# Patient Record
Sex: MALE | Employment: UNEMPLOYED | URBAN - METROPOLITAN AREA
[De-identification: names, ages, dates, MRNs, and addresses within clinical notes are randomized per-mention and may not be internally consistent; named-entity substitution may affect disease eponyms.]

---

## 2017-09-15 ENCOUNTER — HOSPITAL ENCOUNTER (EMERGENCY)
Facility: CLINIC | Age: 27
Discharge: HOME | End: 2017-09-15
Attending: EMERGENCY MEDICINE | Admitting: EMERGENCY MEDICINE
Payer: COMMERCIAL

## 2017-09-15 VITALS
DIASTOLIC BLOOD PRESSURE: 89 MMHG | OXYGEN SATURATION: 98 % | TEMPERATURE: 97.8 F | WEIGHT: 185.8 LBS | HEART RATE: 74 BPM | RESPIRATION RATE: 18 BRPM | SYSTOLIC BLOOD PRESSURE: 132 MMHG | HEIGHT: 72 IN | BODY MASS INDEX: 25.17 KG/M2

## 2017-09-15 DIAGNOSIS — R11.2 NAUSEA AND VOMITING, INTRACTABILITY OF VOMITING NOT SPECIFIED, UNSPECIFIED VOMITING TYPE: ICD-10-CM

## 2017-09-15 DIAGNOSIS — Z79.899 CHRONIC PRESCRIPTION BENZODIAZEPINE USE: ICD-10-CM

## 2017-09-15 DIAGNOSIS — F10.939 ALCOHOL WITHDRAWAL, WITH UNSPECIFIED COMPLICATION (H): ICD-10-CM

## 2017-09-15 LAB
ALBUMIN SERPL-MCNC: 4.2 G/DL (ref 3.4–5)
ALP SERPL-CCNC: 73 U/L (ref 40–150)
ALT SERPL W P-5'-P-CCNC: 25 U/L (ref 0–70)
ANION GAP SERPL CALCULATED.3IONS-SCNC: 8 MMOL/L (ref 3–14)
AST SERPL W P-5'-P-CCNC: 26 U/L (ref 0–45)
BASOPHILS # BLD AUTO: 0 10E9/L (ref 0–0.2)
BASOPHILS NFR BLD AUTO: 0.4 %
BILIRUB SERPL-MCNC: 1.3 MG/DL (ref 0.2–1.3)
BUN SERPL-MCNC: 8 MG/DL (ref 7–30)
CALCIUM SERPL-MCNC: 9.1 MG/DL (ref 8.5–10.1)
CHLORIDE SERPL-SCNC: 105 MMOL/L (ref 94–109)
CO2 SERPL-SCNC: 25 MMOL/L (ref 20–32)
CREAT SERPL-MCNC: 0.75 MG/DL (ref 0.66–1.25)
DIFFERENTIAL METHOD BLD: NORMAL
EOSINOPHIL # BLD AUTO: 0.3 10E9/L (ref 0–0.7)
EOSINOPHIL NFR BLD AUTO: 3.9 %
ERYTHROCYTE [DISTWIDTH] IN BLOOD BY AUTOMATED COUNT: 13.2 % (ref 10–15)
ETHANOL SERPL-MCNC: <0.01 G/DL
GFR SERPL CREATININE-BSD FRML MDRD: >90 ML/MIN/1.7M2
GLUCOSE SERPL-MCNC: 103 MG/DL (ref 70–99)
HCT VFR BLD AUTO: 47.3 % (ref 40–53)
HGB BLD-MCNC: 17 G/DL (ref 13.3–17.7)
IMM GRANULOCYTES # BLD: 0 10E9/L (ref 0–0.4)
IMM GRANULOCYTES NFR BLD: 0.1 %
LIPASE SERPL-CCNC: 154 U/L (ref 73–393)
LYMPHOCYTES # BLD AUTO: 1.9 10E9/L (ref 0.8–5.3)
LYMPHOCYTES NFR BLD AUTO: 28.3 %
MCH RBC QN AUTO: 29.4 PG (ref 26.5–33)
MCHC RBC AUTO-ENTMCNC: 35.9 G/DL (ref 31.5–36.5)
MCV RBC AUTO: 82 FL (ref 78–100)
MONOCYTES # BLD AUTO: 0.7 10E9/L (ref 0–1.3)
MONOCYTES NFR BLD AUTO: 9.8 %
NEUTROPHILS # BLD AUTO: 3.9 10E9/L (ref 1.6–8.3)
NEUTROPHILS NFR BLD AUTO: 57.5 %
NRBC # BLD AUTO: 0 10*3/UL
NRBC BLD AUTO-RTO: 0 /100
PLATELET # BLD AUTO: 262 10E9/L (ref 150–450)
POTASSIUM SERPL-SCNC: 3.8 MMOL/L (ref 3.4–5.3)
PROT SERPL-MCNC: 7.9 G/DL (ref 6.8–8.8)
RBC # BLD AUTO: 5.79 10E12/L (ref 4.4–5.9)
SODIUM SERPL-SCNC: 138 MMOL/L (ref 133–144)
WBC # BLD AUTO: 6.8 10E9/L (ref 4–11)

## 2017-09-15 PROCEDURE — 25000128 H RX IP 250 OP 636: Performed by: EMERGENCY MEDICINE

## 2017-09-15 PROCEDURE — 85025 COMPLETE CBC W/AUTO DIFF WBC: CPT | Performed by: EMERGENCY MEDICINE

## 2017-09-15 PROCEDURE — 25000132 ZZH RX MED GY IP 250 OP 250 PS 637: Performed by: EMERGENCY MEDICINE

## 2017-09-15 PROCEDURE — 99284 EMERGENCY DEPT VISIT MOD MDM: CPT | Mod: 25

## 2017-09-15 PROCEDURE — 96374 THER/PROPH/DIAG INJ IV PUSH: CPT

## 2017-09-15 PROCEDURE — 80053 COMPREHEN METABOLIC PANEL: CPT | Performed by: EMERGENCY MEDICINE

## 2017-09-15 PROCEDURE — 80320 DRUG SCREEN QUANTALCOHOLS: CPT | Performed by: EMERGENCY MEDICINE

## 2017-09-15 PROCEDURE — 83690 ASSAY OF LIPASE: CPT | Performed by: EMERGENCY MEDICINE

## 2017-09-15 RX ORDER — ONDANSETRON 2 MG/ML
4 INJECTION INTRAMUSCULAR; INTRAVENOUS ONCE
Status: COMPLETED | OUTPATIENT
Start: 2017-09-15 | End: 2017-09-15

## 2017-09-15 RX ORDER — CLONAZEPAM 0.5 MG/1
1 TABLET ORAL ONCE
Status: COMPLETED | OUTPATIENT
Start: 2017-09-15 | End: 2017-09-15

## 2017-09-15 RX ADMIN — ONDANSETRON 4 MG: 2 SOLUTION INTRAMUSCULAR; INTRAVENOUS at 01:31

## 2017-09-15 RX ADMIN — CLONAZEPAM 1 MG: 0.5 TABLET ORAL at 02:19

## 2017-09-15 ASSESSMENT — ENCOUNTER SYMPTOMS
DIAPHORESIS: 1
VOMITING: 1

## 2017-09-15 NOTE — ED AVS SNAPSHOT
Emergency Department    [address] [facility] [address]    Phone:  [phone]    Fax:  [phone]                                       Baltazar Pena   MRN: [id number]    Department:   Emergency Department   Date of Visit:  9/15/2017           Patient Information     Date Of Birth          1990        Your diagnoses for this visit were:     Alcohol withdrawal, with unspecified complication (H)     Chronic prescription benzodiazepine use     Nausea and vomiting, intractability of vomiting not specified, unspecified vomiting type        You were seen by Camilo Ramsey MD.      Follow-up Information     Follow up with [facility]. Call in 1 day.    Specialties:  Podiatry, Internal Medicine, Family Medicine    Why:  to make appt to set up a primary care clinic and for recheck early next week    Contact information:    [address] [id number] [phone]        Follow up with  Emergency Department.    Specialty:  EMERGENCY MEDICINE    Why:  As needed, If symptoms worsen    Contact information:    [address] [facility] [address] [phone]      Discharge References/Attachments     ALCOHOL WITHDRAWAL: WHAT TO EXPECT (ENGLISH)      24 Hour Appointment Hotline       To make an appointment at any [facility], call [phone] ([phone]). If you don't have a family doctor or clinic, we will help you find one. [facility] are conveniently located to serve the needs of you and your family.             Review of your medicines      Our records show that you are taking the medicines listed below. If these are incorrect, please call your family doctor or clinic.        Dose / Directions Last dose taken    ADDERALL PO   Dose:  20 mg        Take 20 mg by mouth 2 times daily   Refills:  0        CLONAZEPAM PO   Dose:  1 mg        Take 1 mg by mouth 3 times daily as needed for anxiety   Refills:  0         MELATONIN PO   Dose:  30 mg        Take 30 mg by mouth nightly as needed Patient states he takes 10 x 3mg tablets for a total of 30mg at bedtime if needed.   Refills:  0        NONFORMULARY   Dose:  4 capsule        Take 4 capsules by mouth daily testosterone booster(P6)   Refills:  0                Procedures and tests performed during your visit     Alcohol ethyl    CBC with platelets differential    Comprehensive metabolic panel    Lipase      Orders Needing Specimen Collection     None      Pending Results     No orders found from 9/13/2017 to 9/16/2017.            Pending Culture Results     No orders found from 9/13/2017 to 9/16/2017.            Pending Results Instructions     If you had any lab results that were not finalized at the time of your Discharge, you can call the ED Lab Result RN at [phone]. You will be contacted by this team for any positive Lab results or changes in treatment. The nurses are available 7 days a week from 10A to 6:30P.  You can leave a message 24 hours per day and they will return your call.        Test Results From Your Hospital Stay        9/15/2017  1:58 AM      Component Results     Component Value Ref Range & Units Status    Sodium 138 133 - 144 mmol/L Final    Potassium 3.8 3.4 - 5.3 mmol/L Final    Chloride 105 94 - 109 mmol/L Final    Carbon Dioxide 25 20 - 32 mmol/L Final    Anion Gap 8 3 - 14 mmol/L Final    Glucose 103 (H) 70 - 99 mg/dL Final    Urea Nitrogen 8 7 - 30 mg/dL Final    Creatinine 0.75 0.66 - 1.25 mg/dL Final    GFR Estimate >90 >60 mL/min/1.7m2 Final    Non  GFR Calc    GFR Estimate If Black >90 >60 mL/min/1.7m2 Final    African American GFR Calc    Calcium 9.1 8.5 - 10.1 mg/dL Final    Bilirubin Total 1.3 0.2 - 1.3 mg/dL Final    Albumin 4.2 3.4 - 5.0 g/dL Final    Protein Total 7.9 6.8 - 8.8 g/dL Final    Alkaline Phosphatase 73 40 - 150 U/L Final    ALT 25 0 - 70 U/L Final    AST 26 0 - 45 U/L Final         9/15/2017  1:55 AM       Component Results     Component Value Ref Range & Units Status    Lipase 154 73 - 393 U/L Final         9/15/2017  1:40 AM      Component Results     Component Value Ref Range & Units Status    WBC 6.8 4.0 - 11.0 10e9/L Final    RBC Count 5.79 4.4 - 5.9 10e12/L Final    Hemoglobin 17.0 13.3 - 17.7 g/dL Final    Hematocrit 47.3 40.0 - 53.0 % Final    MCV 82 78 - 100 fl Final    MCH 29.4 26.5 - 33.0 pg Final    MCHC 35.9 31.5 - 36.5 g/dL Final    RDW 13.2 10.0 - 15.0 % Final    Platelet Count 262 150 - 450 10e9/L Final    Diff Method Automated Method  Final    % Neutrophils 57.5 % Final    % Lymphocytes 28.3 % Final    % Monocytes 9.8 % Final    % Eosinophils 3.9 % Final    % Basophils 0.4 % Final    % Immature Granulocytes 0.1 % Final    Nucleated RBCs 0 0 /100 Final    Absolute Neutrophil 3.9 1.6 - 8.3 10e9/L Final    Absolute Lymphocytes 1.9 0.8 - 5.3 10e9/L Final    Absolute Monocytes 0.7 0.0 - 1.3 10e9/L Final    Absolute Eosinophils 0.3 0.0 - 0.7 10e9/L Final    Absolute Basophils 0.0 0.0 - 0.2 10e9/L Final    Abs Immature Granulocytes 0.0 0 - 0.4 10e9/L Final    Absolute Nucleated RBC 0.0  Final         9/15/2017  1:55 AM      Component Results     Component Value Ref Range & Units Status    Ethanol g/dL <0.01 <0.01 g/dL Final                Clinical Quality Measure: Blood Pressure Screening     Your blood pressure was checked while you were in the emergency department today. The last reading we obtained was  BP: 132/89 . Please read the guidelines below about what these numbers mean and what you should do about them.  If your systolic blood pressure (the top number) is less than 120 and your diastolic blood pressure (the bottom number) is less than 80, then your blood pressure is normal. There is nothing more that you need to do about it.  If your systolic blood pressure (the top number) is 120-139 or your diastolic blood pressure (the bottom number) is 80-89, your blood pressure may be higher than it  "should be. You should have your blood pressure rechecked within a year by a primary care provider.  If your systolic blood pressure (the top number) is 140 or greater or your diastolic blood pressure (the bottom number) is 90 or greater, you may have high blood pressure. High blood pressure is treatable, but if left untreated over time it can put you at risk for heart attack, stroke, or kidney failure. You should have your blood pressure rechecked by a primary care provider within the next 4 weeks.  If your provider in the emergency department today gave you specific instructions to follow-up with your doctor or provider even sooner than that, you should follow that instruction and not wait for up to 4 weeks for your follow-up visit.        Thank you for choosing [facility]       Thank you for choosing [facility] for your care. Our goal is always to provide you with excellent care. Hearing back from our patients is one way we can continue to improve our services. Please take a few minutes to complete the written survey that you may receive in the mail after you visit with us. Thank you!        [facility] Information     [facility] lets you send messages to your doctor, view your test results, renew your prescriptions, schedule appointments and more. To sign up, go to [email or web address] . Click on \"Log in\" on the left side of the screen, which will take you to the Welcome page. Then click on \"Sign up Now\" on the right side of the page.     You will be asked to enter the access code listed below, as well as some personal information. Please follow the directions to create your username and password.     Your access code is: [email or web address]  Expires: 2017  3:42 AM     Your access code will  in 90 days. If you need help or a new code, please call your [facility] clinic or [phone].        Care EveryWhere ID     This is your Care EveryWhere ID. This could be used by other organizations to access your [facility] " medical records  [id number]        Equal Access to Services     NEIL ROBLERO : Reji Worley, vidal john, tor hastings. So [facility] [phone].    ATENCIÓN: Si habla español, tiene a najera disposición servicios gratuitos de asistencia lingüística. Llame al [phone].    We comply with applicable federal civil rights laws and [place] laws. We do not discriminate on the basis of race, color, national origin, age, disability sex, sexual orientation or gender identity.            After Visit Summary       This is your record. Keep this with you and show to your community pharmacist(s) and doctor(s) at your next visit.

## 2017-09-15 NOTE — ED PROVIDER NOTES
History     Chief Complaint:  Withdrawal       HPI   Baltazar Pena is a 27 year old male who presents with concerns for alcohol withdrawal. The patient states that for the last week he has been binging and drinking 1 Liter of Vodka daily. His last drink was around 30 hours ago. He states that for the last day he has been shaking with cold sweats and nonbloody vomiting. The patient states that he has completed treatment for alcohol most recently 5 years ago, and has binged occasionally since then. He denies any history of withdrawal seizures. Additionally, he reports that he has been trying to taper off his clonazepam. He is currently prescribed 1 mg of clonazepam 3 times daily, but has only been taking this 2 times per day for the last few months.        Allergies:  No known drug allergies.     Medications:    Clonazepam   Adderall  Melatonin      Past Medical History:    ADHD  Anxiety     Past Surgical History:    History reviewed. No pertinent past surgical history.     Family History:    History reviewed. No pertinent family history.     Social History:  Marital Status: Single   Presents to the ED with mother and his significant other  Tobacco Use: Current daily smoker, 0.50 PPD.   Alcohol Use: No    Review of Systems   Constitutional: Positive for diaphoresis.   Gastrointestinal: Positive for vomiting.   All other systems reviewed and are negative.      Physical Exam   Patient Vitals for the past 24 hrs:   BP Temp Temp src Pulse Heart Rate Resp SpO2 Height Weight   09/15/17 0321 132/89 - - 74 - 18 98 % - -   09/15/17 0300 - - - 65 - - 98 % - -   09/15/17 0217 131/83 - - - - - 99 % - -   09/15/17 0052 151/70 97.8  F (36.6  C) Temporal - 121 18 99 % 1.829 m (6') 84.3 kg (185 lb 12.8 oz)       Physical Exam  General: male sitting upright, mother at bedside  HENT: mucous membranes moist, tongue wnl  Eyes: PERRL without nystagmus  CV: extremities well perfused, regular rhythm  Resp: normal effort, clear  throughout  GI: abdomen soft and nontender, no guarding, negative Mayorga's  MSK: no bony tenderness   Skin: appropriately warm and dry  Neuro: alert, clear speech, oriented, normal tone in extremities, ambulatory, no tremors noted  Psych:  calm, cooperative, denies feeling suicidal, no evidence of hallucinations      Emergency Department Course   Laboratory:  CMP: Glucose 103 (H), otherwise WNL (Creatinine 0.75)   Lipase: 154  CBC:  WBC 6.8, HGB 17.0, , otherwise WNL   Alcohol Ethyl: <0.01    Interventions:  Normal Saline, 1 liter, IV bolus   (0131) Zofran, 4 mg, IV injection   (0219) Klonopin, 1 mg, PO    Emergency Department Course:  Nursing notes and vitals reviewed.  (0131) I performed an exam of the patient as documented above.      A peripheral IV was established. Blood was drawn from the patient. This was sent for laboratory testing, findings above.    The patient passed a PO challenge prior to discharge from the ED.    (0337) I rechecked on the patient     Findings and plan explained to the patient. Patient discharged home with instructions regarding supportive care, medications, and reasons to return. The importance of close follow-up was reviewed.     Impression & Plan    Medical Decision Making:  Baltazar Pena is a 27 year old male who presents with concern for alcohol withdrawal developing after a recent heavy binge and then no alcohol for about the past day. He had modest tachycardia on arrival though he had no tongue fasciculations or hand tremors to suggest significant withdrawal state.  Tachycardia resolved. Compounding matters is that he has been trying to very slowly decrease his clonazepam use. We discussed that decreasing benzodiazepine dose could exacerbate symptoms of alcohol withdrawal. He did not wish to have treatment at detox considered. I do think he requires hospitalization at this time based on what appears to be a fairly mild withdrawal syndrome. I proposed he resume his prior  clonazepam schedule of 1 mg 3 times daily, of which he has an adequate supply. I explained why I am not prescribing additional benzodiazepines or withdrawal treatments.  Close outpatient follow up will be beneficial, and he confirmed that he has adequate resources for such.  He is welcomed back for acute deterioration. He and his family at bedside are comfortable with this plan.         Diagnosis:    ICD-10-CM    1. Alcohol withdrawal, with unspecified complication (H) F10.239    2. Chronic prescription benzodiazepine use Z79.899    3. Nausea and vomiting, intractability of vomiting not specified, unspecified vomiting type R11.2        Disposition:  discharged to home      I, Tabby Prado, am serving as a scribe on 9/15/2017 at 1:31 AM to personally document services performed by Dr. Ramsey based on my observations and the provider's statements to me.    9/15/2017    EMERGENCY DEPARTMENT       Camilo Ramsey MD  09/15/17 0645

## 2017-09-15 NOTE — ED NOTES
Pt wanting to leave, doesn't understand why it's taking so long. IV discontinued and pt allowed to dress while awaiting MD.

## 2017-09-15 NOTE — ED AVS SNAPSHOT
Emergency Department    [address] [place] [address]    Phone:  [phone]    Fax:  [phone]                                       Baltazar Pena   MRN: [id number]    Department:   Emergency Department   Date of Visit:  9/15/2017           After Visit Summary Signature Page     I have received my discharge instructions, and my questions have been answered. I have discussed any challenges I see with this plan with the nurse or doctor.    ..........................................................................................................................................  Patient/Patient Representative Signature      ..........................................................................................................................................  Patient Representative Print Name and Relationship to Patient    ..................................................               ................................................  Date                                            Time    ..........................................................................................................................................  Reviewed by Signature/Title    ...................................................              ..............................................  Date                                                            Time

## 2018-05-15 ENCOUNTER — HOSPITAL ENCOUNTER (EMERGENCY)
Facility: CLINIC | Age: 28
Discharge: HOME | End: 2018-05-15
Payer: COMMERCIAL

## 2018-05-15 VITALS
WEIGHT: 185 LBS | SYSTOLIC BLOOD PRESSURE: 124 MMHG | OXYGEN SATURATION: 100 % | DIASTOLIC BLOOD PRESSURE: 89 MMHG | HEIGHT: 72 IN | RESPIRATION RATE: 16 BRPM | BODY MASS INDEX: 25.06 KG/M2 | TEMPERATURE: 98.5 F

## 2025-07-16 ENCOUNTER — HOSPITAL ENCOUNTER (EMERGENCY)
Facility: CLINIC | Age: 35
Discharge: HOME | End: 2025-07-16
Attending: EMERGENCY MEDICINE
Payer: COMMERCIAL

## 2025-07-16 VITALS
DIASTOLIC BLOOD PRESSURE: 87 MMHG | TEMPERATURE: 98.1 F | HEART RATE: 85 BPM | WEIGHT: 180 LBS | BODY MASS INDEX: 24.38 KG/M2 | OXYGEN SATURATION: 100 % | RESPIRATION RATE: 18 BRPM | HEIGHT: 72 IN | SYSTOLIC BLOOD PRESSURE: 123 MMHG

## 2025-07-16 DIAGNOSIS — F13.930 BENZODIAZEPINE WITHDRAWAL WITHOUT COMPLICATION (H): ICD-10-CM

## 2025-07-16 DIAGNOSIS — F13.21: ICD-10-CM

## 2025-07-16 LAB
AMPHETAMINES UR QL SCN: ABNORMAL
ANION GAP SERPL CALCULATED.3IONS-SCNC: 13 MMOL/L (ref 7–15)
BARBITURATES UR QL SCN: ABNORMAL
BASOPHILS # BLD AUTO: 0.1 10E3/UL (ref 0–0.2)
BASOPHILS NFR BLD AUTO: 1 %
BENZODIAZ UR QL SCN: ABNORMAL
BUN SERPL-MCNC: 9.3 MG/DL (ref 6–20)
BZE UR QL SCN: ABNORMAL
CALCIUM SERPL-MCNC: 9.5 MG/DL (ref 8.8–10.4)
CANNABINOIDS UR QL SCN: ABNORMAL
CHLORIDE SERPL-SCNC: 100 MMOL/L (ref 98–107)
CREAT SERPL-MCNC: 0.84 MG/DL (ref 0.67–1.17)
EGFRCR SERPLBLD CKD-EPI 2021: >90 ML/MIN/1.73M2
EOSINOPHIL # BLD AUTO: 0.1 10E3/UL (ref 0–0.7)
EOSINOPHIL NFR BLD AUTO: 2 %
ERYTHROCYTE [DISTWIDTH] IN BLOOD BY AUTOMATED COUNT: 13.7 % (ref 10–15)
FENTANYL UR QL: ABNORMAL
GLUCOSE SERPL-MCNC: 85 MG/DL (ref 70–99)
HCO3 SERPL-SCNC: 27 MMOL/L (ref 22–29)
HCT VFR BLD AUTO: 47.5 % (ref 40–53)
HGB BLD-MCNC: 16.4 G/DL (ref 13.3–17.7)
IMM GRANULOCYTES # BLD: 0 10E3/UL
IMM GRANULOCYTES NFR BLD: 0 %
LYMPHOCYTES # BLD AUTO: 2.1 10E3/UL (ref 0.8–5.3)
LYMPHOCYTES NFR BLD AUTO: 37 %
MCH RBC QN AUTO: 29.2 PG (ref 26.5–33)
MCHC RBC AUTO-ENTMCNC: 34.5 G/DL (ref 31.5–36.5)
MCV RBC AUTO: 85 FL (ref 78–100)
MONOCYTES # BLD AUTO: 0.3 10E3/UL (ref 0–1.3)
MONOCYTES NFR BLD AUTO: 5 %
NEUTROPHILS # BLD AUTO: 3.2 10E3/UL (ref 1.6–8.3)
NEUTROPHILS NFR BLD AUTO: 56 %
NRBC # BLD AUTO: 0 10E3/UL
NRBC BLD AUTO-RTO: 0 /100
OPIATES UR QL SCN: ABNORMAL
PCP QUAL URINE (ROCHE): ABNORMAL
PLATELET # BLD AUTO: 383 10E3/UL (ref 150–450)
POTASSIUM SERPL-SCNC: 4.6 MMOL/L (ref 3.4–5.3)
RBC # BLD AUTO: 5.61 10E6/UL (ref 4.4–5.9)
SODIUM SERPL-SCNC: 140 MMOL/L (ref 135–145)
WBC # BLD AUTO: 5.7 10E3/UL (ref 4–11)

## 2025-07-16 PROCEDURE — 99284 EMERGENCY DEPT VISIT MOD MDM: CPT | Mod: 25 | Performed by: EMERGENCY MEDICINE

## 2025-07-16 PROCEDURE — 96374 THER/PROPH/DIAG INJ IV PUSH: CPT

## 2025-07-16 PROCEDURE — 36415 COLL VENOUS BLD VENIPUNCTURE: CPT | Performed by: STUDENT IN AN ORGANIZED HEALTH CARE EDUCATION/TRAINING PROGRAM

## 2025-07-16 PROCEDURE — 85025 COMPLETE CBC W/AUTO DIFF WBC: CPT | Performed by: EMERGENCY MEDICINE

## 2025-07-16 PROCEDURE — 80048 BASIC METABOLIC PNL TOTAL CA: CPT | Performed by: EMERGENCY MEDICINE

## 2025-07-16 PROCEDURE — 80048 BASIC METABOLIC PNL TOTAL CA: CPT | Performed by: STUDENT IN AN ORGANIZED HEALTH CARE EDUCATION/TRAINING PROGRAM

## 2025-07-16 PROCEDURE — 250N000011 HC RX IP 250 OP 636: Performed by: STUDENT IN AN ORGANIZED HEALTH CARE EDUCATION/TRAINING PROGRAM

## 2025-07-16 PROCEDURE — 80307 DRUG TEST PRSMV CHEM ANLYZR: CPT | Performed by: STUDENT IN AN ORGANIZED HEALTH CARE EDUCATION/TRAINING PROGRAM

## 2025-07-16 PROCEDURE — 85025 COMPLETE CBC W/AUTO DIFF WBC: CPT | Performed by: STUDENT IN AN ORGANIZED HEALTH CARE EDUCATION/TRAINING PROGRAM

## 2025-07-16 PROCEDURE — 80307 DRUG TEST PRSMV CHEM ANLYZR: CPT | Performed by: EMERGENCY MEDICINE

## 2025-07-16 RX ORDER — TRAZODONE HYDROCHLORIDE 50 MG/1
50-100 TABLET ORAL
Qty: 30 TABLET | Refills: 0 | Status: SHIPPED | OUTPATIENT
Start: 2025-07-16

## 2025-07-16 RX ORDER — GABAPENTIN 300 MG/1
CAPSULE ORAL
Qty: 60 CAPSULE | Refills: 0 | Status: SHIPPED | OUTPATIENT
Start: 2025-07-16 | End: 2025-08-09

## 2025-07-16 RX ORDER — ONDANSETRON 2 MG/ML
4 INJECTION INTRAMUSCULAR; INTRAVENOUS ONCE
Status: COMPLETED | OUTPATIENT
Start: 2025-07-16 | End: 2025-07-16

## 2025-07-16 RX ADMIN — ONDANSETRON 4 MG: 2 INJECTION, SOLUTION INTRAMUSCULAR; INTRAVENOUS at 11:40

## 2025-07-16 ASSESSMENT — ACTIVITIES OF DAILY LIVING (ADL)
ADLS_ACUITY_SCORE: 41
ADLS_ACUITY_SCORE: 41

## 2025-07-16 NOTE — ED TRIAGE NOTES
"Pt presents with withdrawal symptoms from Xanax (nausea, intermittent vomiting, distractibility, and anxiety). Pt was taking 4 tablets of 1-2 mg tablets before bed and stopped all at once last week. Pt states he did not get these medications from a provider. Pt was taking for sleep and now wants to stop taking \"these types of medications\" altogether.     Pt is calm and cooperative, endorses mild anxiety.      Triage Assessment (Adult)       Row Name 07/16/25 5892          Triage Assessment    Airway WDL WDL        Respiratory WDL    Respiratory WDL WDL;all     Rhythm/Pattern, Respiratory unlabored;pattern regular;depth regular;no shortness of breath reported        Skin Circulation/Temperature WDL    Skin Circulation/Temperature WDL WDL        Cardiac WDL    Cardiac WDL X;all     Pulse Rate & Regularity tachycardic        Chest Pain Assessment    Associated Signs/Symptoms anxiety        Peripheral/Neurovascular WDL    Peripheral Neurovascular WDL WDL        Cognitive/Neuro/Behavioral WDL    Cognitive/Neuro/Behavioral WDL X;mood/behavior     Mood/Behavior anxious        Pupils (CN II)    Pupil PERRLA yes     Pupil Size Left 3 mm     Pupil Size Right 3 mm        [place] Coma Scale    Best Eye Response 4-->(E4) spontaneous     Best Motor Response 6-->(M6) obeys commands     Best Verbal Response 5-->(V5) oriented     [place] Coma Scale Score 15                     "

## 2025-07-16 NOTE — ED PROVIDER NOTES
"  Emergency Department Note      History of Present Illness     Chief Complaint   Withdrawal      HPI   Baltazar Pena is a 34 year old male with a history of anxiety presenting to the Emergency Department with his mother for evaluation of withdrawal symptoms. Patient reports that he suddenly quit taking Xanax one week ago. Prior to this he had been taking 4 pills (unsure if 1 or 2 mg) alprazolam (obtained without a prescription) at nighttime for the past few years. He was taking the medication for anxiety and difficulty sleeping, but was not on a prescription from a provider. Since then he has experienced vomiting, nausea, difficulty focusing, difficulty sleeping, and increased anxious state. During the last week he has been using alcohol and marijuana to cope with the withdrawal symptoms but has received minimal relief from this. He denies chronic frequent alcohol or marijuana use. Denies any fevers, seizures, or fainting episodes. No other substance use. He takes no prescription medications. No other pertinent medical problems. He has taken gabapentin in the past which was helpful in getting him off of alprazolam. He reports that the past dosage was \"900 mg Gabapentin x3 each day.\" Patient sees a mental health therapist regarding his anxiety and does not desire further mental health treatment here at the ED. He denies any other symptoms.     Independent Historian   None    Review of External Notes   None    Past Medical History     Medical History and Problem List   ADHD  Anxiety      Medications   Adderall  Klonopin    Surgical History   No pertinent surgical history available.    Physical Exam     Patient Vitals for the past 24 hrs:   BP Temp Temp src Pulse Resp SpO2 Height Weight   07/16/25 1339 123/87 -- -- 85 18 100 % -- --   07/16/25 1130 124/82 98.1  F (36.7  C) Oral 101 18 100 % -- --   07/16/25 1128 -- -- -- -- -- -- 1.829 m (6') 81.6 kg (180 lb)     Physical Exam  General: Well appearing, nontoxic. " Resting comfortably  Head:  Scalp, face, and head appear normal  Eyes:  Pupils equal, round    Conjunctivae noninjected and sclera white  ENT:    The nose is normal    Ears/pinnae are normal  Neck:  Normal range of motion  CV:  RRR, no M/R/G  Resp:  CTAB, no increased WOB   MSK:  Normal tone  Skin:  No rash or lesions noted.  Neuro:  Speech is normal and fluent. No tremors or restlessness.     Moves all extremities spontaneously  Psych: Awake, Alert. Normal affect      Appropriate interactions. Endorses anxious mood and occasional panic attacks. No psychomotor agitation. No evidence of psychosis or response to internal stimuli.          Diagnostics     Lab Results   Labs Ordered and Resulted from Time of ED Arrival to Time of ED Departure   URINE DRUG SCREEN PANEL - Abnormal       Result Value    Amphetamines Urine Screen Negative      Barbituates Urine Screen Negative      Benzodiazepine Urine Screen Negative      Cannabinoids Urine Screen Positive (*)     Cocaine Urine Screen Negative      Fentanyl Qual Urine Screen Negative      Opiates Urine Screen Negative      PCP Urine Screen Negative     BASIC METABOLIC PANEL - Normal    Sodium 140      Potassium 4.6      Chloride 100      Carbon Dioxide (CO2) 27      Anion Gap 13      Urea Nitrogen 9.3      Creatinine 0.84      GFR Estimate >90      Calcium 9.5      Glucose 85     CBC WITH PLATELETS AND DIFFERENTIAL    WBC Count 5.7      RBC Count 5.61      Hemoglobin 16.4      Hematocrit 47.5      MCV 85      MCH 29.2      MCHC 34.5      RDW 13.7      Platelet Count 383      % Neutrophils 56      % Lymphocytes 37      % Monocytes 5      % Eosinophils 2      % Basophils 1      % Immature Granulocytes 0      NRBCs per 100 WBC 0      Absolute Neutrophils 3.2      Absolute Lymphocytes 2.1      Absolute Monocytes 0.3      Absolute Eosinophils 0.1      Absolute Basophils 0.1      Absolute Immature Granulocytes 0.0      Absolute NRBCs 0.0         Imaging   No orders to display        Independent Interpretation   None    ED Course      Medications Administered   Medications   ondansetron (ZOFRAN) injection 4 mg (4 mg Intravenous $Given 7/16/25 1140)       Procedures   Procedures     Discussion of Management   None    ED Course   ED Course as of 07/16/25 1952 Wed Jul 16, 2025   1317 I obtained history and examined the patient as noted above.         Additional Documentation  None    Medical Decision Making / Diagnosis     CMS Diagnoses: None    MIPS   None               MDM   Baltazar Pena is a 34 year old male who presents to the emergency department for evaluation of benzodiazepine withdrawal.  He quit cold turkey after taking Xanax daily.  On my evaluation he is well-appearing, hemodynamically stable and afebrile.  No significant agitation, psychosis, hallucinations.  No seizures.  No tremors.  No hyperthermia.  Overall symptoms are mild.  Patient reports success with use of gabapentin in the past.  He is also very concerned about sleeping and we discussed trazodone for sleep.  I will start the patient on gabapentin taper as well as trazodone for sleep.  I encouraged him to completely abstain from any further benzodiazepine use.  He should follow-up with his therapist.  We discussed evaluation by [facility] for further mental health assessment however the patient declines stating at this time he just wants medication to help with his benzodiazepine withdrawal.  He denies any additional mental health concerns.  Patient is agreeable to plan of care.  Prescriptions for home as documented below.  Return precautions provided and he was discharged in stable condition.    Disposition   The patient was discharged.     Diagnosis     ICD-10-CM    1. Moderate benzodiazepine use disorder in early remission (H)  F13.21       2. Benzodiazepine withdrawal without complication (H)  F13.930            Discharge Medications   Discharge Medication List as of 7/16/2025  1:36 PM        START taking these  medications    Details   gabapentin (NEURONTIN) 300 MG capsule Take 2 capsules (600 mg) by mouth 3 times daily for 3 days, THEN 1 capsule (300 mg) 3 times daily for 7 days, THEN 1 capsule (300 mg) 2 times daily for 7 days, THEN 1 capsule (300 mg) at bedtime for 7 days., Disp-60 capsule, R-0, E-Prescribe      traZODone (DESYREL) 50 MG tablet Take 1-2 tablets ( mg) by mouth nightly as needed for sleep. Start with 1 tablet at bedtime. If not effective after 3 nights increase to 2 tablets at bedtime., Disp-30 tablet, R-0, E-Prescribe               Scribe Disclosure:  I, Baltazar Nunes, am serving as a scribe at 1:07 PM on 7/16/2025 to document services personally performed by Nik Roque MD based on my observations and the provider's statements to me.        Nik Roque MD  07/16/25 1954